# Patient Record
Sex: MALE | Race: WHITE
[De-identification: names, ages, dates, MRNs, and addresses within clinical notes are randomized per-mention and may not be internally consistent; named-entity substitution may affect disease eponyms.]

---

## 2018-11-19 ENCOUNTER — HOSPITAL ENCOUNTER (OUTPATIENT)
Dept: HOSPITAL 61 - PCVCIMAG | Age: 63
Discharge: HOME | End: 2018-11-19
Attending: INTERNAL MEDICINE
Payer: COMMERCIAL

## 2018-11-19 DIAGNOSIS — R06.02: ICD-10-CM

## 2018-11-19 DIAGNOSIS — I10: ICD-10-CM

## 2018-11-19 DIAGNOSIS — R07.89: ICD-10-CM

## 2018-11-19 DIAGNOSIS — E78.5: ICD-10-CM

## 2018-11-19 DIAGNOSIS — Z82.49: ICD-10-CM

## 2018-11-19 DIAGNOSIS — I25.9: Primary | ICD-10-CM

## 2018-11-19 PROCEDURE — 93325 DOPPLER ECHO COLOR FLOW MAPG: CPT

## 2018-11-19 PROCEDURE — 93351 STRESS TTE COMPLETE: CPT

## 2018-11-19 NOTE — PCVCIMAG
--------------- APPROVED REPORT --------------





Study performed:  11/19/2018 13:13:02



Exam:  Stress Echocardiogram

Indication: Chest pressure,dyspnea

Patient Location: Echo lab

Stress Nurse: Kendal Adams RN

Room #: 2

Status: routine



Ht: 5 ft 11 in  

HR: 62 bpm      BP: 150/84 mmHg

Rhythm: NSR



Medical History

Medical History: HTN, Hyperlipidemia

Cardiac Risk Factors: HTN, Hyperlipidemia, FHX of CAD

Previous Cardiac Procedures: none

Pretest Chest Pain Characteristics: No chest pain

Exercise History: Indeterminate



Procedure

The patient underwent an Exercise Stress Test using the Reji 

Protocol. Blood pressure, heart rate, and EKG were monitored.

An Echocardiogram was performed by technician in four stages in quad 

fashion.  At peak stress, four selected images were obtained and 

placed side by side with resting images for comparison.



Stress Test Details

Stress Test:  Exercise stress testing was performed using a Reji 

protocol.

HR

Resting HR:            62 bpmMax Heart Rate (APMHR): 156 bpm 

Max HR Achieved:  148 bpmTarget HR (85% APMHR): 132 bpm

% of APMHR:         94

Recovery HR:            82 bpm

HR response to stress: Normal HR response to stress



BP

Resting BP:  150/84 mmHg

Max BP:       180/84 mmHg

Recovery BP:       142/78 mmHg

BP response to stress: Normal blood pressure response to 

stress.

ECG

Resting ECG:  Sinus Rhythm

Stress ECG:     Sinus Rhythm

ST Change: Non-ischemic

Arrhythmia:    Rare PVCs,couplet PVCs,PACs

Recovery ECG: Sinus Rhythm

Recovery ST Change: Non-ischemic

Recovery Arrhythmia: None



Clinical

Reason for Termination: Maximal effort

Stress Symptoms: fatigue

Exercise duration: 10 min 03 sec

Highest Stage Achieved: Stage 4: 4.2 mph at 16% grade. 

Exercise capacity: 13.4 METs

Overall Exercise Capacity for Age: Good

Scale: Active

Angina Score: None

No complications.



Stress ECG Conclusion

The patient exercised according to the REJI protocol for 10:03 mins; 

achieving a work level of  13.4 METS. 

The resting heart rate of 62 bpm bong to a maximum heart rate of 148  

bpm. 

This value represent 94% of the maximal, age-predicted heart rate. 

The resting blood pressure of 150/84 mmHg, bong to a maximum blood 

pressure of  180/84mmHg. 

The exercise test was stopped due to fatigue.



Pre-Stress Echo

The resting Echocardiogram showed normal left ventricular 

contractility with an estimated Ejection Fraction of about 55-60%. 

Normal wall motion in all segments on baseline images.



Post-Stress Echo

The stress Echocardiogram showed normal left ventricular 

contractility with an estimated Ejection Fraction of about 65-70%. 

Normal augmentation of wall motion in all segments on post stress 

images.



Clinical

No clinical or ECG evidence for ischemia.



Conclusion

Clinical Response:  Non-ischemic

Exercise Capacity:  Superior

Stress ECG Response:  Non-ischemic

Stress Echo Images:  Non-ischemic

No clinical, EKG or echocardiographic evidence for ischemia. 

No echocardiographic evidence for exercise induced ischemia.

Normal stress echocardiogram with maximal exercise 

stress.



&lt;Conclusion&gt;

No clinical, EKG or echocardiographic evidence for ischemia. 

No echocardiographic evidence for exercise induced ischemia.

Normal stress echocardiogram with maximal exercise stress.

## 2019-04-15 ENCOUNTER — HOSPITAL ENCOUNTER (OUTPATIENT)
Dept: HOSPITAL 61 - PCVCIMAG | Age: 64
Discharge: HOME | End: 2019-04-15
Attending: INTERNAL MEDICINE
Payer: COMMERCIAL

## 2019-04-15 DIAGNOSIS — I10: ICD-10-CM

## 2019-04-15 DIAGNOSIS — I26.99: ICD-10-CM

## 2019-04-15 DIAGNOSIS — I82.402: Primary | ICD-10-CM

## 2019-04-15 DIAGNOSIS — R06.02: ICD-10-CM

## 2019-04-15 PROCEDURE — 93308 TTE F-UP OR LMTD: CPT

## 2019-04-15 PROCEDURE — 93971 EXTREMITY STUDY: CPT

## 2019-04-15 NOTE — PCVCIMAG
--------------- APPROVED REPORT --------------





Study performed:  04/15/2019 11:07:34



EXAM: Limited 2D and color flow Echocardiogram

Patient Location: Echo lab

Status:  routine



BSA:         2.13

HR: 57 bpmBP:          134/76 mmHg

Rhythm: Bradycardia



Other Information 

Study Quality: Adequate



Indications

Pulmonary Embolism

Dyspnea 



2D Dimensions

IVSd:  12.67 (7-11mm)

LVDd:  38.39 mm

PWd:  10.39 (7-11mm)Ascending Ao:  34.51 (22-36mm)

LVDs:  24.34 (25-40mm)

Left Atrium:  37.21 (27-40mm)

Aortic Root:  32.12 mm

LV Single Plane 4CH:  49.91 %

LV Single Plane 2CH:  44.93 %

Biplane EF:  49.5 %



Tricuspid Valve

TR Peak Fernando.:  2.49 m/s

TR Peak Gr.:  24.77 mmHg



Left Ventricle

The left ventricle is normal size. There is normal LV segmental wall 

motion. Mild concentric left ventricular hypertrophy. Left 

ventricular systolic function is within lower limits of normal. LVEF 

is 50%. This study is not technically sufficient to allow evaluation 

of the LV diastolic function.



Right Ventricle

Right ventricle is mild to moderately dilated. The right ventricular 

systolic function is normal.



Atria

The left atrium size is normal. Right atrium is moderately 

dilated.



Aortic Valve

The aortic valve is normal in structure. No aortic regurgitation is 

present. There is no aortic valvular stenosis.



Mitral Valve

The mitral valve is normal in structure. There is no mitral valve 

regurgitation noted. No evidence of mitral valve stenosis.



Tricuspid Valve

The tricuspid valve is normal in structure. Mild tricuspid valve 

regurgitation noted with PAP of 32 mmHg.



Pulmonic Valve

The pulmonary valve is normal in structure. There is no pulmonic 

valvular regurgitation.



Great Vessels

The aortic root is normal in size. IVC is normal in size and 

collapses >50% with inspiration.



Pericardium

There is no pericardial effusion. There is no pleural 

effusion.



<Conclusion>

The left ventricle is normal size.

Mild concentric left ventricular hypertrophy.

LVEF is 50%.

This study is not technically sufficient to allow evaluation of the 

LV diastolic function.

Right ventricle is mild to moderately dilated.

The right ventricular systolic function is normal.

Right atrium is moderately dilated.

The aortic valve is normal in structure.

There is no mitral valve regurgitation noted.

Mild tricuspid valve regurgitation noted with PAP of 32 mmHg.

The aortic root is normal in size.

There is no pericardial effusion.

## 2019-04-15 NOTE — PCVCIMAG
EXAM: VENOUS DUPLEX LEFT LOWER EXTREMITY



INDICATION: Leg pain and swelling.



FINDINGS:

Left leg: Severe nonocclusive thrombus throughout the common femoral

vein as well as the lower external iliac vein.  Occlusive thrombus

throughout the mid and upper main femoral vein.  Severe nonocclusive

thrombus throughout the lower main femoral vein and upper popliteal

vein.  Mild nonocclusive thrombus lower popliteal vein.  Flow is seen

throughout the calf veins with only minimal scattered thrombus.



IMPRESSION: 

Extensive deep venous thrombosis of the left lower extremity appears

to have shown some minimal improvement since prior study of March 2019.

Venography and intravascular ultrasound evaluation of the iliac veins

may be of value to exclude iliac vein compression/stenosis.



LOC:[id number]